# Patient Record
(demographics unavailable — no encounter records)

---

## 2024-11-06 NOTE — HISTORY OF PRESENT ILLNESS
[de-identified] : coughing with mucus and runny nose [FreeTextEntry6] : 9 y/o F complaining of nasal congestion and cough x3 days. Denies any fever, SOB or change in appetite.

## 2024-11-06 NOTE — DISCUSSION/SUMMARY
[FreeTextEntry1] : 7 y/o F w/ presentation consistent with acute URI. Erythematous oropharynx noted on exam, will evaluate for strep throat.  Plan: 1. Rapid strep (negative); throat culture 2. Supportive care with Tylenol/Motrin PRN, increased fluids, keeping head elevated and rest  3. Monitor and return with any new or worsening symptoms.

## 2025-05-27 NOTE — HISTORY OF PRESENT ILLNESS
[de-identified] : FEVER, ITCHY THROAT, COUGH X3D; FELT WARM AND TREATED WITH TYLENOL LAST NIGHT; DENIES SICK CONTACTS AT HOME; HAS GOOD APPETITE AND ENERGY LEVEL

## 2025-05-27 NOTE — PHYSICAL EXAM
[Clear Rhinorrhea] : clear rhinorrhea [Mucoid Discharge] : mucoid discharge [Erythematous Oropharynx] : erythematous oropharynx [NL] : no abnormal lymph nodes palpated

## 2025-05-27 NOTE — REVIEW OF SYSTEMS
[Chills] : no chills [Malaise] : no malaise [Headache] : no headache [Ear Pain] : no ear pain [Nasal Discharge] : nasal discharge [Nasal Congestion] : nasal congestion [Sore Throat] : sore throat [Tachypnea] : not tachypneic [Wheezing] : no wheezing [Cough] : cough [Congestion] : congestion [Negative] : Genitourinary

## 2025-05-27 NOTE — PLAN
[TextEntry] : Recommend supportive care including antipyretics, fluids, OTC cough/cold medications if age-appropriate, and nasal saline followed by nasal suction. Return if symptoms worsen or persist.